# Patient Record
Sex: MALE | Race: WHITE | Employment: FULL TIME | RURAL
[De-identification: names, ages, dates, MRNs, and addresses within clinical notes are randomized per-mention and may not be internally consistent; named-entity substitution may affect disease eponyms.]

---

## 2024-02-21 ENCOUNTER — HOSPITAL ENCOUNTER (EMERGENCY)
Facility: HOSPITAL | Age: 64
Discharge: HOME | End: 2024-02-21
Attending: EMERGENCY MEDICINE
Payer: COMMERCIAL

## 2024-02-21 VITALS
RESPIRATION RATE: 18 BRPM | HEART RATE: 55 BPM | SYSTOLIC BLOOD PRESSURE: 109 MMHG | DIASTOLIC BLOOD PRESSURE: 73 MMHG | BODY MASS INDEX: 32.9 KG/M2 | HEIGHT: 71 IN | OXYGEN SATURATION: 95 % | WEIGHT: 235 LBS | TEMPERATURE: 97.8 F

## 2024-02-21 DIAGNOSIS — L23.7 POISON OAK DERMATITIS: Primary | ICD-10-CM

## 2024-02-21 PROCEDURE — 99283 EMERGENCY DEPT VISIT LOW MDM: CPT

## 2024-02-21 PROCEDURE — 6370000000 HC RX 637 (ALT 250 FOR IP): Performed by: EMERGENCY MEDICINE

## 2024-02-21 RX ORDER — PREDNISONE 20 MG/1
20 TABLET ORAL ONCE
Status: COMPLETED | OUTPATIENT
Start: 2024-02-21 | End: 2024-02-21

## 2024-02-21 RX ORDER — IRBESARTAN 300 MG/1
300 TABLET ORAL NIGHTLY
COMMUNITY

## 2024-02-21 RX ORDER — ATENOLOL 25 MG/1
25 TABLET ORAL DAILY
COMMUNITY

## 2024-02-21 RX ORDER — ESZOPICLONE 3 MG/1
3 TABLET, FILM COATED ORAL NIGHTLY
COMMUNITY

## 2024-02-21 RX ORDER — GLIPIZIDE 5 MG/1
5 TABLET ORAL
COMMUNITY

## 2024-02-21 RX ORDER — TIRZEPATIDE 5 MG/.5ML
5 INJECTION, SOLUTION SUBCUTANEOUS WEEKLY
COMMUNITY

## 2024-02-21 RX ORDER — PREDNISONE 20 MG/1
20 TABLET ORAL DAILY
Qty: 5 TABLET | Refills: 0 | Status: SHIPPED | OUTPATIENT
Start: 2024-02-21 | End: 2024-02-26

## 2024-02-21 RX ADMIN — PREDNISONE 20 MG: 20 TABLET ORAL at 08:59

## 2024-02-21 ASSESSMENT — PAIN - FUNCTIONAL ASSESSMENT: PAIN_FUNCTIONAL_ASSESSMENT: 0-10

## 2024-02-21 NOTE — ED PROVIDER NOTES
.        [facility]  EMERGENCY DEPARTMENT ENCOUNTER        Pt Name: Abram Trinidad  MRN: [id number]  Birthdate 1960  Date of evaluation: 2/21/2024  Provider: Jacqueline Mcneal MD  PCP: No primary care provider on file.  Note Started: 8:41 AM EST 2/21/24    CHIEF COMPLAINT       No chief complaint on file.      HISTORY OF PRESENT ILLNESS: 1 or more Elements     History from : Patient    Limitations to history : None    Abram Trinidad is a 63 y.o. male who presents puffiness of his face and itching he developed a rash between his fingers on his arms and upper chest which is now spreading to his face after working outside the itching and rash on his hands started about 2 days ago and the puffiness redness and rash to his face just started today.  It has been spreading ever since it first started he has not had any swelling of his lips or tongue he has not had any shortness of breath he does remember being exposed to poison oak when he was out working in his yard.    Nursing Notes were all reviewed and agreed with or any disagreements were addressed in the HPI.    REVIEW OF SYSTEMS :      Review of Systems     systems reviewed and negative except as in HPI/MDM    SURGICAL HISTORY   No past surgical history on file.    CURRENTMEDICATIONS       Previous Medications    No medications on file       ALLERGIES     Patient has no allergy information on record.    FAMILYHISTORY     No family history on file.     SOCIAL HISTORY          SCREENINGS                         CIWA Assessment  BP: 134/81  Pulse: 55           PHYSICAL EXAM  1 or more Elements     ED Triage Vitals [02/21/24 0835]   BP Temp Temp Source Pulse Respirations SpO2 Height Weight - Scale   134/81 97.8 °F (36.6 °C) Oral 55 16 96 % 1.803 m (5' 11\") 106.6 kg (235 lb)       Physical Exam    My pulse oximetry interpretation is 96%which is within the normal range    GENERAL APPEARANCE: Awake and alert. Cooperative. No acute

## 2024-02-21 NOTE — ED NOTES
AVS and Rx reviewed with patient, understanding verbalized. Patient discharged with no further needs or concerns voiced.